# Patient Record
Sex: FEMALE | Race: WHITE | URBAN - METROPOLITAN AREA
[De-identification: names, ages, dates, MRNs, and addresses within clinical notes are randomized per-mention and may not be internally consistent; named-entity substitution may affect disease eponyms.]

---

## 2017-03-29 ENCOUNTER — IMPORTED ENCOUNTER (OUTPATIENT)
Dept: URBAN - METROPOLITAN AREA CLINIC 31 | Facility: CLINIC | Age: 52
End: 2017-03-29

## 2017-03-29 PROCEDURE — 92014 COMPRE OPH EXAM EST PT 1/>: CPT

## 2017-03-29 NOTE — PATIENT DISCUSSION
1.  Refractive error - update glasses2. Return for an appointment in 1 year for comprehensive exam. with Dr. Dennis Bourgeois.

## 2019-05-28 ENCOUNTER — IMPORTED ENCOUNTER (OUTPATIENT)
Dept: URBAN - METROPOLITAN AREA CLINIC 31 | Facility: CLINIC | Age: 54
End: 2019-05-28

## 2019-05-28 PROBLEM — H40.013: Noted: 2019-05-28

## 2019-05-28 PROCEDURE — 92014 COMPRE OPH EXAM EST PT 1/>: CPT

## 2019-05-28 PROCEDURE — 92133 CPTRZD OPH DX IMG PST SGM ON: CPT

## 2019-05-28 NOTE — PATIENT DISCUSSION
1.  Refractive error - new glasses. 2. Ocular hypertension OU - Increased IOPs. Large cups longstanding. No signs of glaucomatous damage to the optic nerve based on today's examination and OCT testing. Will continue to monitor. 3. Return for an appointment in October for pressure checkand pachymetry with Dr. Donny Narayanan.

## 2019-09-23 ENCOUNTER — APPOINTMENT (RX ONLY)
Dept: URBAN - METROPOLITAN AREA CLINIC 148 | Facility: CLINIC | Age: 54
Setting detail: DERMATOLOGY
End: 2019-09-23

## 2019-09-23 DIAGNOSIS — L43.8 OTHER LICHEN PLANUS: ICD-10-CM

## 2019-09-23 PROBLEM — M12.9 ARTHROPATHY, UNSPECIFIED: Status: ACTIVE | Noted: 2019-09-23

## 2019-09-23 PROCEDURE — 99202 OFFICE O/P NEW SF 15 MIN: CPT

## 2019-09-23 PROCEDURE — ? COUNSELING

## 2019-09-23 PROCEDURE — ? PRESCRIPTION

## 2019-09-23 PROCEDURE — ? ADDITIONAL NOTES

## 2019-09-23 PROCEDURE — ? TREATMENT REGIMEN

## 2019-09-23 PROCEDURE — ? FULL BODY SKIN EXAM - DECLINED

## 2019-09-23 RX ORDER — TRIAMCINOLONE ACETONIDE 1 MG/G
CREAM TOPICAL BID
Qty: 1 | Refills: 0 | Status: ERX | COMMUNITY
Start: 2019-09-23

## 2019-09-23 RX ADMIN — TRIAMCINOLONE ACETONIDE: 1 CREAM TOPICAL at 19:42

## 2019-09-23 ASSESSMENT — LOCATION ZONE DERM
LOCATION ZONE: TOE
LOCATION ZONE: FEET

## 2019-09-23 ASSESSMENT — LOCATION SIMPLE DESCRIPTION DERM
LOCATION SIMPLE: RIGHT FOOT
LOCATION SIMPLE: RIGHT GREAT TOE

## 2019-09-23 ASSESSMENT — LOCATION DETAILED DESCRIPTION DERM
LOCATION DETAILED: RIGHT MEDIAL DORSAL FOOT
LOCATION DETAILED: RIGHT DORSAL GREAT TOE

## 2019-09-23 NOTE — HPI: RASH
How Severe Is Your Rash?: mild
Is This A New Presentation, Or A Follow-Up?: Rash
Additional History: Pt reports walking barefoot through a home other than hers. Patient washes with dial soao

## 2019-09-23 NOTE — PROCEDURE: ADDITIONAL NOTES
Detail Level: Simple
Additional Notes: Consider biopsy vs ILK If not resolved next ov. Pt has had hepatitis screening. Results are negative

## 2020-01-09 ENCOUNTER — APPOINTMENT (RX ONLY)
Dept: URBAN - METROPOLITAN AREA CLINIC 148 | Facility: CLINIC | Age: 55
Setting detail: DERMATOLOGY
End: 2020-01-09

## 2020-01-09 DIAGNOSIS — L43.8 OTHER LICHEN PLANUS: ICD-10-CM

## 2020-01-09 PROCEDURE — 99213 OFFICE O/P EST LOW 20 MIN: CPT

## 2020-01-09 PROCEDURE — ? PRESCRIPTION

## 2020-01-09 PROCEDURE — ? ORDER TESTS

## 2020-01-09 PROCEDURE — ? ADDITIONAL NOTES

## 2020-01-09 PROCEDURE — ? FULL BODY SKIN EXAM - DECLINED

## 2020-01-09 PROCEDURE — ? COUNSELING

## 2020-01-09 PROCEDURE — ? TREATMENT REGIMEN

## 2020-01-09 RX ORDER — CLOBETASOL PROPIONATE 0.5 MG/G
CREAM TOPICAL BID
Qty: 1 | Refills: 1 | Status: ERX | COMMUNITY
Start: 2020-01-09

## 2020-01-09 RX ADMIN — CLOBETASOL PROPIONATE: 0.5 CREAM TOPICAL at 00:00

## 2020-01-09 ASSESSMENT — LOCATION SIMPLE DESCRIPTION DERM
LOCATION SIMPLE: LEFT FOREARM
LOCATION SIMPLE: RIGHT FOOT
LOCATION SIMPLE: RIGHT GREAT TOE

## 2020-01-09 ASSESSMENT — LOCATION ZONE DERM
LOCATION ZONE: FEET
LOCATION ZONE: ARM
LOCATION ZONE: TOE

## 2020-01-09 ASSESSMENT — LOCATION DETAILED DESCRIPTION DERM
LOCATION DETAILED: RIGHT MEDIAL DORSAL FOOT
LOCATION DETAILED: LEFT VENTRAL PROXIMAL FOREARM
LOCATION DETAILED: RIGHT DORSAL GREAT TOE

## 2020-01-09 NOTE — PROCEDURE: ORDER TESTS
Billing Type: Third-Party Bill
Performing Laboratory: [id number]
Expected Date Of Service: 01/09/2020
Bill For Surgical Tray: no

## 2020-06-08 ENCOUNTER — IMPORTED ENCOUNTER (OUTPATIENT)
Dept: URBAN - METROPOLITAN AREA CLINIC 31 | Facility: CLINIC | Age: 55
End: 2020-06-08

## 2020-06-08 PROBLEM — H40.053: Noted: 2020-06-08

## 2020-06-08 PROCEDURE — 92014 COMPRE OPH EXAM EST PT 1/>: CPT

## 2020-06-08 PROCEDURE — 92133 CPTRZD OPH DX IMG PST SGM ON: CPT

## 2020-06-08 NOTE — PATIENT DISCUSSION
Return for an appointment in 1 year for comprehensive exam and optic nerve OCT with Dr. Flor Garcia.

## 2020-06-08 NOTE — PATIENT DISCUSSION
1.  Ocular HTN OU:  Elevated intraocular pressure without signs of glaucomatous damage to the optic nerve. Will continue to monitor for development of glaucoma. 2. Refractive error - replace broken progressives. 3. Return for an appointment in 1 year for comprehensive exam and optic nerve OCT with Dr. Brittny Britt.

## 2021-08-17 ENCOUNTER — TELEPHONE ENCOUNTER (OUTPATIENT)
Dept: URBAN - METROPOLITAN AREA CLINIC 9 | Facility: CLINIC | Age: 56
End: 2021-08-17

## 2022-04-02 ASSESSMENT — VISUAL ACUITY
OD_SC: 20/30
OD_CC: 20/60+1
OS_CC: 20/60
OS_SC: 20/40
OD_CC: 20/60-2
OD_SC: 20/20
OS_CC: 20/25-3
OU_CC: 20/25
OU_SC: 20/25
OS_SC: 20/20

## 2022-04-02 ASSESSMENT — TONOMETRY
OS_IOP_MMHG: 19
OD_IOP_MMHG: 21
OD_IOP_MMHG: 19
OD_IOP_MMHG: 21
OS_IOP_MMHG: 21
OS_IOP_MMHG: 21

## 2022-07-14 ENCOUNTER — COMPREHENSIVE EXAM (OUTPATIENT)
Dept: URBAN - METROPOLITAN AREA CLINIC 29 | Facility: CLINIC | Age: 57
End: 2022-07-14

## 2022-07-14 DIAGNOSIS — H25.13: ICD-10-CM

## 2022-07-14 DIAGNOSIS — H40.053: ICD-10-CM

## 2022-07-14 PROCEDURE — 92015 DETERMINE REFRACTIVE STATE: CPT

## 2022-07-14 PROCEDURE — 92014 COMPRE OPH EXAM EST PT 1/>: CPT

## 2022-07-14 PROCEDURE — 92133 CPTRZD OPH DX IMG PST SGM ON: CPT

## 2022-07-14 ASSESSMENT — VISUAL ACUITY
OS_CC: 20/40
OS_PH: 20/25+2
OD_CC: 20/60
OD_CC: J1+
OS_CC: J1+
OD_PH: 20/30

## 2022-07-14 ASSESSMENT — TONOMETRY
OD_IOP_MMHG: 16
OS_IOP_MMHG: 17

## 2022-07-14 NOTE — PATIENT DISCUSSION
Good IOPs today. OCT OU unreliable. Patient had problems maintaining fixation during tests. Last reliable OCTs were full OU. Patient understands condition, prognosis and need for follow up care.

## 2022-07-30 ENCOUNTER — TELEPHONE ENCOUNTER (OUTPATIENT)
Age: 57
End: 2022-07-30

## 2022-07-31 ENCOUNTER — TELEPHONE ENCOUNTER (OUTPATIENT)
Age: 57
End: 2022-07-31

## 2023-08-07 ENCOUNTER — COMPREHENSIVE EXAM (OUTPATIENT)
Dept: URBAN - METROPOLITAN AREA CLINIC 29 | Facility: CLINIC | Age: 58
End: 2023-08-07

## 2023-08-07 DIAGNOSIS — H52.4: ICD-10-CM

## 2023-08-07 DIAGNOSIS — H52.03: ICD-10-CM

## 2023-08-07 DIAGNOSIS — H52.223: ICD-10-CM

## 2023-08-07 PROCEDURE — 92015 DETERMINE REFRACTIVE STATE: CPT

## 2023-08-07 PROCEDURE — 92014 COMPRE OPH EXAM EST PT 1/>: CPT

## 2023-08-07 ASSESSMENT — TONOMETRY
OS_IOP_MMHG: 17
OD_IOP_MMHG: 17

## 2023-08-07 ASSESSMENT — VISUAL ACUITY
OD_CC: 20/20-2
OS_CC: 20/20
OU_CC: J1+

## 2024-08-13 ENCOUNTER — PREPPED CHART (OUTPATIENT)
Dept: URBAN - METROPOLITAN AREA CLINIC 29 | Facility: CLINIC | Age: 59
End: 2024-08-13

## 2024-11-13 ENCOUNTER — COMPREHENSIVE EXAM (OUTPATIENT)
Dept: URBAN - METROPOLITAN AREA CLINIC 29 | Facility: CLINIC | Age: 59
End: 2024-11-13

## 2024-11-13 DIAGNOSIS — H52.223: ICD-10-CM

## 2024-11-13 DIAGNOSIS — H52.4: ICD-10-CM

## 2024-11-13 DIAGNOSIS — H52.03: ICD-10-CM

## 2024-11-13 PROCEDURE — 92014 COMPRE OPH EXAM EST PT 1/>: CPT

## 2024-11-13 PROCEDURE — 92015 DETERMINE REFRACTIVE STATE: CPT

## 2025-05-30 ENCOUNTER — COMPREHENSIVE EXAM (OUTPATIENT)
Age: 60
End: 2025-05-30

## 2025-05-30 DIAGNOSIS — H52.03: ICD-10-CM

## 2025-05-30 DIAGNOSIS — H52.4: ICD-10-CM

## 2025-05-30 DIAGNOSIS — H52.223: ICD-10-CM

## 2025-05-30 PROCEDURE — 92310-4 LEVEL 4 NEW SOFT LENS: Mod: 21

## 2025-05-30 PROCEDURE — 92015 DETERMINE REFRACTIVE STATE: CPT

## 2025-05-30 PROCEDURE — 92014 COMPRE OPH EXAM EST PT 1/>: CPT

## 2025-07-16 ENCOUNTER — CONTACT LENSES/GLASSES VISIT (OUTPATIENT)
Age: 60
End: 2025-07-16

## 2025-07-16 DIAGNOSIS — H52.4: ICD-10-CM

## 2025-07-16 DIAGNOSIS — H52.223: ICD-10-CM

## 2025-07-16 DIAGNOSIS — H52.03: ICD-10-CM

## 2025-07-16 PROCEDURE — 92310F
